# Patient Record
Sex: FEMALE | Race: WHITE
[De-identification: names, ages, dates, MRNs, and addresses within clinical notes are randomized per-mention and may not be internally consistent; named-entity substitution may affect disease eponyms.]

---

## 2017-08-27 ENCOUNTER — HOSPITAL ENCOUNTER (EMERGENCY)
Dept: HOSPITAL 35 - ER | Age: 77
Discharge: HOME | End: 2017-08-27
Payer: COMMERCIAL

## 2017-08-27 VITALS — BODY MASS INDEX: 26.16 KG/M2 | WEIGHT: 157.01 LBS | HEIGHT: 65 IN

## 2017-08-27 VITALS — DIASTOLIC BLOOD PRESSURE: 74 MMHG | SYSTOLIC BLOOD PRESSURE: 132 MMHG

## 2017-08-27 DIAGNOSIS — I25.2: ICD-10-CM
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
## 2017-08-27 DIAGNOSIS — M25.562: ICD-10-CM
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
## 2017-08-27 DIAGNOSIS — Z88.2: ICD-10-CM

## 2017-08-27 DIAGNOSIS — Z95.0: ICD-10-CM

## 2017-08-27 DIAGNOSIS — K21.9: ICD-10-CM

## 2017-08-27 DIAGNOSIS — M79.644: ICD-10-CM

## 2017-08-27 DIAGNOSIS — M25.512: ICD-10-CM
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
## 2017-08-27 DIAGNOSIS — Z88.5: ICD-10-CM

## 2017-08-27 DIAGNOSIS — M54.5: Primary | ICD-10-CM

## 2017-08-27 DIAGNOSIS — Z85.43: ICD-10-CM

## 2017-08-27 DIAGNOSIS — I10: ICD-10-CM
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
## 2018-04-19 ENCOUNTER — HOSPITAL ENCOUNTER (OUTPATIENT)
Dept: HOSPITAL 35 - ULTRA | Age: 78
End: 2018-04-19
Attending: INTERNAL MEDICINE
Payer: COMMERCIAL

## 2018-04-19 DIAGNOSIS — Z88.2: ICD-10-CM

## 2018-04-19 DIAGNOSIS — I77.9: Primary | ICD-10-CM

## 2018-04-19 DIAGNOSIS — Z88.5: ICD-10-CM

## 2018-05-21 ENCOUNTER — HOSPITAL ENCOUNTER (OUTPATIENT)
Dept: HOSPITAL 35 - ULTRA | Age: 78
End: 2018-05-21
Attending: INTERNAL MEDICINE
Payer: COMMERCIAL

## 2018-05-21 DIAGNOSIS — M79.89: ICD-10-CM

## 2018-05-21 DIAGNOSIS — M79.605: ICD-10-CM

## 2018-05-21 DIAGNOSIS — M79.604: Primary | ICD-10-CM

## 2018-11-05 ENCOUNTER — HOSPITAL ENCOUNTER (OUTPATIENT)
Dept: HOSPITAL 35 - SEN | Age: 78
End: 2018-11-05
Attending: NURSE PRACTITIONER
Payer: COMMERCIAL

## 2018-11-05 DIAGNOSIS — J20.9: Primary | ICD-10-CM

## 2018-12-07 ENCOUNTER — HOSPITAL ENCOUNTER (OUTPATIENT)
Dept: HOSPITAL 35 - CV | Age: 78
End: 2018-12-07
Attending: INTERNAL MEDICINE
Payer: COMMERCIAL

## 2018-12-07 DIAGNOSIS — I48.91: ICD-10-CM

## 2018-12-07 DIAGNOSIS — I10: Primary | ICD-10-CM

## 2019-03-06 ENCOUNTER — HOSPITAL ENCOUNTER (OUTPATIENT)
Dept: HOSPITAL 35 - CV | Age: 79
End: 2019-03-06
Attending: INTERNAL MEDICINE
Payer: COMMERCIAL

## 2019-03-06 DIAGNOSIS — I48.91: ICD-10-CM

## 2019-03-06 DIAGNOSIS — I08.3: Primary | ICD-10-CM

## 2019-03-06 DIAGNOSIS — I25.2: ICD-10-CM

## 2019-03-06 DIAGNOSIS — Z95.5: ICD-10-CM

## 2019-09-22 ENCOUNTER — HOSPITAL ENCOUNTER (EMERGENCY)
Dept: HOSPITAL 35 - ER | Age: 79
Discharge: LEFT BEFORE DISCHARGE | End: 2019-09-22
Payer: COMMERCIAL

## 2019-09-22 VITALS — WEIGHT: 127.49 LBS | HEIGHT: 64 IN | BODY MASS INDEX: 21.77 KG/M2

## 2019-09-22 VITALS — SYSTOLIC BLOOD PRESSURE: 143 MMHG | DIASTOLIC BLOOD PRESSURE: 63 MMHG

## 2019-09-22 DIAGNOSIS — I25.2: ICD-10-CM

## 2019-09-22 DIAGNOSIS — Z95.0: ICD-10-CM

## 2019-09-22 DIAGNOSIS — I10: ICD-10-CM

## 2019-09-22 DIAGNOSIS — Z88.5: ICD-10-CM

## 2019-09-22 DIAGNOSIS — M79.661: Primary | ICD-10-CM

## 2019-09-22 DIAGNOSIS — Z98.890: ICD-10-CM

## 2019-09-22 DIAGNOSIS — M43.22: ICD-10-CM

## 2019-09-22 DIAGNOSIS — M79.662: ICD-10-CM

## 2019-09-22 DIAGNOSIS — Z88.2: ICD-10-CM

## 2019-09-22 DIAGNOSIS — K21.9: ICD-10-CM

## 2019-11-15 ENCOUNTER — HOSPITAL ENCOUNTER (OUTPATIENT)
Dept: HOSPITAL 35 - ULTRA | Age: 79
End: 2019-11-15
Attending: NURSE PRACTITIONER
Payer: COMMERCIAL

## 2019-11-15 DIAGNOSIS — M25.872: Primary | ICD-10-CM

## 2019-11-15 DIAGNOSIS — M25.871: ICD-10-CM

## 2019-11-17 ENCOUNTER — HOSPITAL ENCOUNTER (INPATIENT)
Dept: HOSPITAL 35 - ER | Age: 79
LOS: 3 days | Discharge: HOME | DRG: 682 | End: 2019-11-20
Attending: FAMILY MEDICINE | Admitting: FAMILY MEDICINE
Payer: COMMERCIAL

## 2019-11-17 VITALS — SYSTOLIC BLOOD PRESSURE: 130 MMHG | DIASTOLIC BLOOD PRESSURE: 91 MMHG

## 2019-11-17 VITALS
HEIGHT: 64.02 IN | DIASTOLIC BLOOD PRESSURE: 82 MMHG | WEIGHT: 142 LBS | SYSTOLIC BLOOD PRESSURE: 148 MMHG | BODY MASS INDEX: 24.24 KG/M2

## 2019-11-17 VITALS — SYSTOLIC BLOOD PRESSURE: 144 MMHG | DIASTOLIC BLOOD PRESSURE: 65 MMHG

## 2019-11-17 VITALS — SYSTOLIC BLOOD PRESSURE: 148 MMHG | DIASTOLIC BLOOD PRESSURE: 60 MMHG

## 2019-11-17 DIAGNOSIS — Z79.1: ICD-10-CM
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
## 2019-11-17 DIAGNOSIS — T50.3X5A: ICD-10-CM

## 2019-11-17 DIAGNOSIS — N17.0: Primary | ICD-10-CM

## 2019-11-17 DIAGNOSIS — I10: ICD-10-CM

## 2019-11-17 DIAGNOSIS — E86.0: ICD-10-CM

## 2019-11-17 DIAGNOSIS — Z95.0: ICD-10-CM

## 2019-11-17 DIAGNOSIS — Z88.6: ICD-10-CM

## 2019-11-17 DIAGNOSIS — Z85.43: ICD-10-CM

## 2019-11-17 DIAGNOSIS — K21.9: ICD-10-CM

## 2019-11-17 DIAGNOSIS — E43: ICD-10-CM

## 2019-11-17 DIAGNOSIS — E87.1: ICD-10-CM

## 2019-11-17 DIAGNOSIS — Z28.21: ICD-10-CM

## 2019-11-17 DIAGNOSIS — T50.0X5A: ICD-10-CM

## 2019-11-17 DIAGNOSIS — I25.10: ICD-10-CM

## 2019-11-17 DIAGNOSIS — T39.395A: ICD-10-CM

## 2019-11-17 DIAGNOSIS — J40: ICD-10-CM

## 2019-11-17 DIAGNOSIS — Z88.2: ICD-10-CM

## 2019-11-17 DIAGNOSIS — I25.2: ICD-10-CM

## 2019-11-17 DIAGNOSIS — M10.071: ICD-10-CM

## 2019-11-17 DIAGNOSIS — Z88.8: ICD-10-CM

## 2019-11-17 LAB
ALBUMIN SERPL-MCNC: 2.5 G/DL (ref 3.4–5)
ALT SERPL-CCNC: 17 U/L (ref 30–65)
ANION GAP SERPL CALC-SCNC: 9 MMOL/L (ref 7–16)
AST SERPL-CCNC: 33 U/L (ref 15–37)
BASOPHILS NFR BLD AUTO: 0.7 % (ref 0–2)
BILIRUB SERPL-MCNC: 0.5 MG/DL
BILIRUB UR-MCNC: NEGATIVE MG/DL
BUN SERPL-MCNC: 70 MG/DL (ref 7–18)
CALCIUM SERPL-MCNC: 9.5 MG/DL (ref 8.5–10.1)
CHLORIDE SERPL-SCNC: 101 MMOL/L (ref 98–107)
CO2 SERPL-SCNC: 23 MMOL/L (ref 21–32)
COLOR UR: YELLOW
CREAT SERPL-MCNC: 3.6 MG/DL (ref 0.6–1)
EOSINOPHIL NFR BLD: 1.6 % (ref 0–3)
ERYTHROCYTE [DISTWIDTH] IN BLOOD BY AUTOMATED COUNT: 13.7 % (ref 10.5–14.5)
GLUCOSE SERPL-MCNC: 120 MG/DL (ref 74–106)
GRANULOCYTES NFR BLD MANUAL: 77.9 % (ref 36–66)
HCT VFR BLD CALC: 34.7 % (ref 37–47)
HGB BLD-MCNC: 11.8 GM/DL (ref 12–15)
KETONES UR STRIP-MCNC: (no result) MG/DL
LYMPHOCYTES NFR BLD AUTO: 8.8 % (ref 24–44)
MCH RBC QN AUTO: 31.6 PG (ref 26–34)
MCHC RBC AUTO-ENTMCNC: 33.9 G/DL (ref 28–37)
MCV RBC: 93.1 FL (ref 80–100)
MONOCYTES NFR BLD: 11 % (ref 1–8)
NEUTROPHILS # BLD: 7.4 THOU/UL (ref 1.4–8.2)
PLATELET # BLD: 211 THOU/UL (ref 150–400)
POTASSIUM SERPL-SCNC: 5.6 MMOL/L (ref 3.5–5.1)
PROT SERPL-MCNC: 6.3 G/DL (ref 6.4–8.2)
RBC # BLD AUTO: 3.73 MIL/UL (ref 4.2–5)
RBC # UR STRIP: (no result) /UL
SODIUM SERPL-SCNC: 133 MMOL/L (ref 136–145)
SP GR UR STRIP: 1.01 (ref 1–1.03)
URINE CLARITY: CLEAR
URINE GLUCOSE-RANDOM*: NEGATIVE
URINE LEUKOCYTES-REFLEX: NEGATIVE
URINE NITRITE-REFLEX: NEGATIVE
URINE PROTEIN (DIPSTICK): NEGATIVE
UROBILINOGEN UR STRIP-ACNC: 0.2 E.U./DL (ref 0.2–1)
WBC # BLD AUTO: 9.6 THOU/UL (ref 4–11)

## 2019-11-17 PROCEDURE — 10040 EXTRACTION: CPT

## 2019-11-17 NOTE — HC
[facility]
[place]   [address]                     CONSULTATION                  
_______________________________________________________________________________
 
Name:       OREN OLIVARES                   Room #:         450-P       ADM IN  
M.R.#:      [id number]                       Account #:      [id number]  
Admission:  11/17/19    Attend Phys:    Roque Giles MD  
Discharge:              Date of Birth:  [birth year]  
                                                          Report #: [id number]
                                                                    [id number]   
_______________________________________________________________________________
THIS REPORT FOR:   //name//                          
 
CC: Roque Giles
 
REASON FOR CONSULTATION:  Acute kidney injury.
 
REASON FOR PRESENTATION:  Abnormal labs.
 
HISTORY OF PRESENT ILLNESS:  This is a 79-year-old with a baseline creatinine of
around 1.5.  She is known to have hypertension, coronary artery disease, status
post pacemaker insertion.  She also is known to have ovarian cancer, status post
multiple surgeries for that.  [place].  She
was evaluated by her primary care physician for what she describes as urinary
tract infection and was prescribed some antibiotic.  She also has had some
issues with gout and was taking indomethacin for the last couple of weeks. 
Laboratory values came back with a creatinine value of 3.8.  She was also
maintained on spironolactone and her potassium was 5.6.  She denies any nausea
or vomiting.  No urinary symptoms.  She was admitted to further evaluate her
acute kidney injury.
 
PAST MEDICAL HISTORY:
1.  Hypertension.
2.  Coronary artery disease.
3.  Status post pacemaker insertion.
4.  Left knee arthroscopy.
5.  Disk fusion at L2-L3, L4-L5, L5-S1.
6.  Cervical neck surgeries.
7.  Ovarian cancer.
8.  Status post ablation for her rhythm issues.
 
MEDICATIONS:
1.  Indomethacin.
2.  Metoprolol.
3.  Lasix.
4.  Aspirin.
5.  Potassium.
6.  Spironolactone.
 
ALLERGIES:  CODEINE, HYDROMORPHONE.
 
SOCIAL HISTORY:  She is retired.  No drug or alcohol abuse.
 
REVIEW OF SYSTEMS:  No known kidney disease in the family.
 
REVIEW OF SYSTEMS:
GENERAL:  Significant for weakness.
 
 
 
[facility]
[address]
[place]   [address]                     CONSULTATION                  
_______________________________________________________________________________
 
Name:       OREN OLIVARES                   Room #:         450-P       ADM IN  
.R.#:      [id number]                       Account #:      [id number]  
Admission:  11/17/19    Attend Phys:    Roque Giles MD  
Discharge:              Date of Birth:  [birth year]  
                                                          Report #: [id number]
                                                                    [id number]   
_______________________________________________________________________________
CARDIOVASCULAR:  No chest pain or palpitation.
PULMONARY:  No cough or hemoptysis.
GASTROINTESTINAL:  No nausea or vomiting.
GENITOURINARY:  Recently treated for urinary tract infections.
MUSCULOSKELETAL:  Gout episodes.  This was involving her knee, big toe, hands.
 
PHYSICAL EXAMINATION:
GENERAL:  She is alert, oriented.
VITAL SIGNS:  Pulse rate 67, temperature is 37.1, blood pressure is 122/53.
HEAD AND NECK:  No jugular venous distention.
CHEST:  Clear to auscultation bilaterally.
CARDIOVASCULAR:  Regular with no rub detected.
ABDOMEN:  Soft, nontender with no hepatosplenomegaly.
LOWER EXTREMITIES:  No edema with intact peripheral pulses.
 
LABORATORY VALUES DATA:  Reviewed.  Her sodium is 133, potassium is 5.6, BUN is
70, creatinine is 3.6, uric acid is 11.5.
 
IMPRESSION AND PLAN:
1.  Acute kidney injury related to indomethacin.
2.  Hyperkalemia due to Aldactone and other nonsteroidal anti-inflammatory
medications along with potassium supplementation.
3.  Hyponatremia.
4.  Known coronary artery disease.
5.  Status post pacemaker insertion.
6.  Discontinue all offending agents of the above-mentioned.
7.  Start IV fluid.
8.  Expect this to fully recover.
9.  We will need her gout to be treated with steroids for now and then we will
handle her uric acid level when the acute event resolves.
 
 
 
 
 
 
 
 
 
 
 
 
 
 
                         
   By:                               
                   
D: 11/18/19 1023                           _____________________________________
T: 11/18/19 1455                           Duc Villegas MD            /nt

## 2019-11-17 NOTE — NUR
PT COMPLAINING ABOUT DELAY IN GETTING WARM BLANKET, NO ONE TO COMPLAIN TO
WILL NOTIFY PT ADVOCATE TO SEE IN MORNING.  REPORTS "THIS IS WHY YOU ARE GOING
BANKRUPT".  
INFORMED WE WILL HAVE PT ADVOCATE SPEAK WITH PT IN THE AM.  HER COMPLAINTS
OF NO ONE TO CUT UP HER FOOD AND DELAY IN GETTING BLANKED.

## 2019-11-17 NOTE — NUR
PATIENT ARRIVED VIA WHEELCHAIR FROM ED AT 2025. ALERT AND ORIENTED X4. DENIES
PAIN. IVF INFUSING W/O COMPLICATION. PATIENT HAS MEDICATION, ONE IN A
PRESCRIPTION BOTTLE AND A WEEKLY SORTING WITH LOOSE MEDICATIONS. PATIENT
REFUSED TO HAVE THIS NURSE SEND TO PHARMACY SO THEY WERE PLACED IN THE CLOSET.
ARGUMENTATIVE AT TIMES, HOWEVER, EVENTUALLY MORE COOPERATIVE WITH CARE. SCD'S
IN PLACE. WILL MONITOR.

## 2019-11-18 VITALS — SYSTOLIC BLOOD PRESSURE: 103 MMHG | DIASTOLIC BLOOD PRESSURE: 55 MMHG

## 2019-11-18 VITALS — SYSTOLIC BLOOD PRESSURE: 108 MMHG | DIASTOLIC BLOOD PRESSURE: 59 MMHG

## 2019-11-18 VITALS — DIASTOLIC BLOOD PRESSURE: 53 MMHG | SYSTOLIC BLOOD PRESSURE: 122 MMHG

## 2019-11-18 VITALS — SYSTOLIC BLOOD PRESSURE: 114 MMHG | DIASTOLIC BLOOD PRESSURE: 51 MMHG

## 2019-11-18 LAB
ANION GAP SERPL CALC-SCNC: 11 MMOL/L (ref 7–16)
BUN SERPL-MCNC: 54 MG/DL (ref 7–18)
CALCIUM SERPL-MCNC: 8.4 MG/DL (ref 8.5–10.1)
CHLORIDE SERPL-SCNC: 108 MMOL/L (ref 98–107)
CO2 SERPL-SCNC: 21 MMOL/L (ref 21–32)
CREAT SERPL-MCNC: 3 MG/DL (ref 0.6–1)
GLUCOSE SERPL-MCNC: 115 MG/DL (ref 74–106)
POTASSIUM SERPL-SCNC: 4.3 MMOL/L (ref 3.5–5.1)
SODIUM SERPL-SCNC: 140 MMOL/L (ref 136–145)
URINE CREATININE-RANDOM*: 131.3 MG/DL
URINE SODIUM-RANDOM*: 50 MMOL/L

## 2019-11-18 NOTE — NUR
PT ADMITTED RELATED TO ARF, DEHYDRATION, GOUT, NAUSEA. CM REVIEWED CHART AND
SPOKE WITH CARE TEAM. CM MET WITH PT AT BEDSIDE THIS DAY. PT IS A&O X4. CM
ROLE INTRODUCED. PT INDICATED SHE LIVES ALONE IN A HOUSE WITH 4 STEPS TO ENTER
AND NO STEPS INSIDE. PT INDICATED SHE HAD BEEN INDEPDENDENET WITH GAIT AND
ADLS PTA. PT INDICATED NO DME OR HH HX. PT INDICATED SHE PLANS TO RETURN HOME
ONCE MEDICALLY STABLE. CM TO FOLLOW AS INDICATED WITH DC PLANNING.

## 2019-11-18 NOTE — NUR
Nutrition: Pt assessed due to 2 point trigger per nutrition screen on admit
w/ 34# or more of wt loss in last 3 mo, decreased appetite. Admit: acute renal
failure, dehydration, gout, nausea. No H&P note yet, but ED note states recent
return from 2 wk trip to [place]. Pt reports wt loss started June/July; loss of
appetite, no interest in going to kitchen. Agrees some might be heat related
w/ summer temps, but lower appetite still ongoing. Ate some of breakfast,
but no meal records yet charted. EMR shows conflicting wt loss reports.
10/22/17: 156# per pt report
09/22/19: 127.5# per pt report, would reflect potential 28# wt loss
11/17/19: 142.3# per bedscale this admit, would indicate a +15# gain in 2 mo
Not enough evidence to confirm significant wt loss, with most recent wt's
indicating a 15# gain from Sept-Nov. Pt stated not worried at all about loss.
Seemed very angry about everything. Started to obtain some food preferences
to help make menu changes and boost po success. Was planning to take lunch &
dinner orders but pt became too frustrated and gave up, refusing RD help.
Helped relocate room phone to bedside table w/ friend's assistance. Low
nutrition risk for now as pt declined interventions. Follow po trends.

## 2019-11-18 NOTE — NUR
Received awake on bed. On room air. Pt very irritable when I came in to
introduce my self, she is very upset re: call light- work order has already
been out in by night shift- offered pt to be transferred to another room but
refused. Refused to take oral tablets, blood test and vital signs monitoring
as well- went to patient, talked and explained to her but still refused. No
nausea, no vomiting and no abdominal pain noted. With Ns 1t 100cc/hr, infusing
well at R wrist. With redness noted on bilateral big toes.
Went back to patient's room and tried to talk to her again, agreed to have her
blood drawn for BMP- taken by lab staff, vital signs taken and agreed to be
transferred to [facility]- assured her that call light is working and I personally
checked it before she transfers- pt cooperative afterwards.
Dr Giles and Dr Bueno informed re: transfer to [facility]. A/W urine sample. On
standby assist. Assisted in ADLs. Seen by dietician today, assisted pt in
ordering her meals.

## 2019-11-19 VITALS — DIASTOLIC BLOOD PRESSURE: 60 MMHG | SYSTOLIC BLOOD PRESSURE: 142 MMHG

## 2019-11-19 VITALS — SYSTOLIC BLOOD PRESSURE: 131 MMHG | DIASTOLIC BLOOD PRESSURE: 52 MMHG

## 2019-11-19 VITALS — DIASTOLIC BLOOD PRESSURE: 72 MMHG | SYSTOLIC BLOOD PRESSURE: 135 MMHG

## 2019-11-19 LAB
ALBUMIN SERPL-MCNC: 2.3 G/DL (ref 3.4–5)
ANION GAP SERPL CALC-SCNC: 11 MMOL/L (ref 7–16)
BUN SERPL-MCNC: 43 MG/DL (ref 7–18)
CALCIUM SERPL-MCNC: 8.5 MG/DL (ref 8.5–10.1)
CHLORIDE SERPL-SCNC: 111 MMOL/L (ref 98–107)
CO2 SERPL-SCNC: 20 MMOL/L (ref 21–32)
CREAT SERPL-MCNC: 2.4 MG/DL (ref 0.6–1)
GLUCOSE SERPL-MCNC: 72 MG/DL (ref 74–106)
PHOSPHATE SERPL-MCNC: 3.6 MG/DL (ref 2.5–4.9)
POTASSIUM SERPL-SCNC: 4.7 MMOL/L (ref 3.5–5.1)
SODIUM SERPL-SCNC: 142 MMOL/L (ref 136–145)

## 2019-11-19 NOTE — NUR
Received awake on bed. Requested to have her medications given at a later
time because she wanted to rest. A+Ox4. On room air. Vital signs stable. With
NS at 100cc/hr, infusing well at L wrist. Able to turn in her bed. Refused to
have breakfast- she said she was not feeling well, Dr Giles aware and was
in the room to assess patient, ordered breathing treatment for her, chest xray
and flonase; pending discharge ordered- a/w results of chest xray. Assisted in
ADLs. Up ad dorcas.

## 2019-11-19 NOTE — NUR
PT CARE ASSUMED AT 1900 WITH PT IN BED.PT IS A/O X4.PT IS VERY IRRITABLE.PT
COMPLAINED OF GI UPSET AND DR ERICKSON CALLED AND TUMS ORDERED AND GIVEN WITH
RELIEF.PT IS [place] AND HAD CHRIS HEARING AIDS.CONTINUE TO MONITOR POC

## 2019-11-20 VITALS — SYSTOLIC BLOOD PRESSURE: 140 MMHG | DIASTOLIC BLOOD PRESSURE: 60 MMHG

## 2019-11-20 LAB
ANION GAP SERPL CALC-SCNC: 9 MMOL/L (ref 7–16)
BUN SERPL-MCNC: 23 MG/DL (ref 7–18)
CALCIUM SERPL-MCNC: 9.6 MG/DL (ref 8.5–10.1)
CHLORIDE SERPL-SCNC: 110 MMOL/L (ref 98–107)
CO2 SERPL-SCNC: 23 MMOL/L (ref 21–32)
CREAT SERPL-MCNC: 1.7 MG/DL (ref 0.6–1)
GLUCOSE SERPL-MCNC: 77 MG/DL (ref 74–106)
POTASSIUM SERPL-SCNC: 4 MMOL/L (ref 3.5–5.1)
SODIUM SERPL-SCNC: 142 MMOL/L (ref 136–145)

## 2019-11-20 NOTE — NUR
PT CARE ASSUMED WITH PT IN BED WITH ARIANNA.PT IS A/O X4.PT COMPLAINED OF GI
UPSET AND WAS GIVEN TUMS.PT HAD 4 LOOSE STOOL.DR RIVAS INFORMED AND ORDEDED
IMODIUM AND LABS ( CDIFF AND OVA AND PARASITES).ISOLATION PRECAUTION FOR CDIFF
PUT IN PLACE.PT IS IRRITABLE.WAITING FOR STOOL SAMPLE TO SEND TO LAB .CONTINUE
TO MONITOR PLAN OF CARE

## 2019-11-20 NOTE — NUR
Assumed care of pt at 0700. Pt a&o x4. Anxious and irritable about not being
able to have stool sample collected. Pt voided in clean hat but sample
contaminated with urine. Provider verbal order to discharge patient with home
kit. Pt able to provide stool sample before discharge. Provider aware.

## 2020-04-03 ENCOUNTER — HOSPITAL ENCOUNTER (OUTPATIENT)
Dept: HOSPITAL 35 - SJCVC | Age: 80
End: 2020-04-03
Attending: INTERNAL MEDICINE
Payer: COMMERCIAL

## 2020-04-03 DIAGNOSIS — Z87.891: ICD-10-CM

## 2020-04-03 DIAGNOSIS — Z45.018: Primary | ICD-10-CM

## 2020-04-03 DIAGNOSIS — I25.10: ICD-10-CM

## 2020-04-03 DIAGNOSIS — Z79.899: ICD-10-CM

## 2020-04-03 DIAGNOSIS — I48.0: ICD-10-CM

## 2020-04-03 DIAGNOSIS — I10: ICD-10-CM

## 2020-04-03 DIAGNOSIS — M10.9: ICD-10-CM

## 2020-04-03 DIAGNOSIS — E78.00: ICD-10-CM

## 2020-12-01 ENCOUNTER — HOSPITAL ENCOUNTER (EMERGENCY)
Dept: HOSPITAL 35 - ER | Age: 80
Discharge: HOME | End: 2020-12-01
Payer: COMMERCIAL

## 2020-12-01 VITALS — WEIGHT: 117.99 LBS | HEIGHT: 64 IN | BODY MASS INDEX: 20.14 KG/M2

## 2020-12-01 VITALS — SYSTOLIC BLOOD PRESSURE: 120 MMHG | DIASTOLIC BLOOD PRESSURE: 54 MMHG

## 2020-12-01 DIAGNOSIS — Z79.899: ICD-10-CM

## 2020-12-01 DIAGNOSIS — Z88.5: ICD-10-CM

## 2020-12-01 DIAGNOSIS — N18.9: ICD-10-CM

## 2020-12-01 DIAGNOSIS — I12.9: ICD-10-CM

## 2020-12-01 DIAGNOSIS — Z88.2: ICD-10-CM

## 2020-12-01 DIAGNOSIS — Z95.0: ICD-10-CM

## 2020-12-01 DIAGNOSIS — Z88.8: ICD-10-CM

## 2020-12-01 DIAGNOSIS — I25.2: ICD-10-CM

## 2020-12-01 DIAGNOSIS — M10.9: Primary | ICD-10-CM

## 2020-12-01 DIAGNOSIS — K21.9: ICD-10-CM

## 2020-12-01 LAB
%HYPO/RBC NFR BLD AUTO: (no result) %
ANION GAP SERPL CALC-SCNC: 8 MMOL/L (ref 7–16)
ANISOCYTOSIS BLD QL SMEAR: (no result)
BASOPHILS NFR BLD AUTO: 0.5 % (ref 0–2)
BUN SERPL-MCNC: 33 MG/DL (ref 7–18)
CALCIUM SERPL-MCNC: 9.6 MG/DL (ref 8.5–10.1)
CHLORIDE SERPL-SCNC: 96 MMOL/L (ref 98–107)
CO2 SERPL-SCNC: 28 MMOL/L (ref 21–32)
CREAT SERPL-MCNC: 2.5 MG/DL (ref 0.6–1)
EOSINOPHIL NFR BLD: 0.1 % (ref 0–3)
ERYTHROCYTE [DISTWIDTH] IN BLOOD BY AUTOMATED COUNT: 14.1 % (ref 10.5–14.5)
GLUCOSE SERPL-MCNC: 119 MG/DL (ref 74–106)
GRANULOCYTES NFR BLD MANUAL: 81.9 % (ref 36–66)
HCT VFR BLD CALC: 38.9 % (ref 37–47)
HGB BLD-MCNC: 13.1 GM/DL (ref 12–15)
LYMPHOCYTES NFR BLD AUTO: 6.9 % (ref 24–44)
MCH RBC QN AUTO: 32 PG (ref 26–34)
MCHC RBC AUTO-ENTMCNC: 33.7 G/DL (ref 28–37)
MCV RBC: 94.9 FL (ref 80–100)
MICROCYTES: (no result)
MONOCYTES NFR BLD: 10.6 % (ref 1–8)
NEUTROPHILS # BLD: 10.1 THOU/UL (ref 1.4–8.2)
OVALOCYTES BLD QL SMEAR: (no result)
PLATELET # BLD EST: NORMAL 10*3/UL
PLATELET # BLD: 181 THOU/UL (ref 150–400)
POIKILOCYTOSIS BLD QL SMEAR: (no result)
POTASSIUM SERPL-SCNC: 3.7 MMOL/L (ref 3.5–5.1)
RBC # BLD AUTO: 4.1 MIL/UL (ref 4.2–5)
SCHISTOCYTES BLD QL SMEAR: (no result)
SODIUM SERPL-SCNC: 132 MMOL/L (ref 136–145)
URIC ACID*: 6.6 MG/DL (ref 2.6–6)
WBC # BLD AUTO: 12.4 THOU/UL (ref 4–11)

## 2021-01-21 ENCOUNTER — HOSPITAL ENCOUNTER (OUTPATIENT)
Dept: HOSPITAL 35 - SJCVC | Age: 81
End: 2021-01-21
Attending: INTERNAL MEDICINE
Payer: COMMERCIAL

## 2021-01-21 DIAGNOSIS — I25.10: Primary | ICD-10-CM

## 2021-01-21 DIAGNOSIS — I48.0: ICD-10-CM

## 2021-01-21 DIAGNOSIS — I10: ICD-10-CM

## 2021-01-21 DIAGNOSIS — M10.9: ICD-10-CM

## 2021-01-21 DIAGNOSIS — Z79.899: ICD-10-CM

## 2021-01-21 DIAGNOSIS — Z87.891: ICD-10-CM

## 2021-01-21 DIAGNOSIS — E78.00: ICD-10-CM

## 2021-01-21 DIAGNOSIS — Z98.890: ICD-10-CM

## 2021-11-17 ENCOUNTER — HOSPITAL ENCOUNTER (OUTPATIENT)
Dept: HOSPITAL 35 - SJCVCIMAG | Age: 81
End: 2021-11-17
Attending: INTERNAL MEDICINE
Payer: COMMERCIAL

## 2021-11-17 DIAGNOSIS — E78.00: ICD-10-CM

## 2021-11-17 DIAGNOSIS — I08.0: Primary | ICD-10-CM

## 2021-11-17 DIAGNOSIS — Z79.899: ICD-10-CM

## 2021-11-17 DIAGNOSIS — Z95.0: ICD-10-CM

## 2021-11-17 DIAGNOSIS — I25.10: ICD-10-CM

## 2021-11-17 DIAGNOSIS — I48.0: ICD-10-CM

## 2021-11-17 DIAGNOSIS — I44.7: ICD-10-CM

## 2021-11-17 DIAGNOSIS — M10.9: ICD-10-CM

## 2021-11-17 DIAGNOSIS — F41.9: ICD-10-CM

## 2021-11-17 DIAGNOSIS — I12.9: ICD-10-CM

## 2021-11-17 DIAGNOSIS — Z87.891: ICD-10-CM

## 2021-11-17 DIAGNOSIS — Z88.2: ICD-10-CM

## 2021-11-17 DIAGNOSIS — Z88.5: ICD-10-CM

## 2021-11-17 DIAGNOSIS — N18.9: ICD-10-CM

## 2021-11-17 DIAGNOSIS — R94.31: ICD-10-CM

## 2021-11-17 DIAGNOSIS — Z88.8: ICD-10-CM
